# Patient Record
Sex: MALE | URBAN - METROPOLITAN AREA
[De-identification: names, ages, dates, MRNs, and addresses within clinical notes are randomized per-mention and may not be internally consistent; named-entity substitution may affect disease eponyms.]

---

## 2017-03-21 ENCOUNTER — PRE VISIT (OUTPATIENT)
Dept: OTOLARYNGOLOGY | Facility: CLINIC | Age: 47
End: 2017-03-21

## 2017-03-21 NOTE — TELEPHONE ENCOUNTER
1.  Date/reason for appt: 4/3/17 - throat pain  2.  Referring provider: Self  3.  Call to patient (Yes / No - short description): No, per  Amanda Reynolds who spoke with pt, pt has no records.   4.  Previous care at: None

## 2017-04-03 ENCOUNTER — OFFICE VISIT (OUTPATIENT)
Dept: OTOLARYNGOLOGY | Facility: CLINIC | Age: 47
End: 2017-04-03

## 2017-04-03 VITALS — BODY MASS INDEX: 28.58 KG/M2 | HEIGHT: 69 IN | WEIGHT: 193 LBS

## 2017-04-03 DIAGNOSIS — K21.9 GASTROESOPHAGEAL REFLUX DISEASE, ESOPHAGITIS PRESENCE NOT SPECIFIED: ICD-10-CM

## 2017-04-03 DIAGNOSIS — R07.0 THROAT PAIN: Primary | ICD-10-CM

## 2017-04-03 DIAGNOSIS — K21.9 ESOPHAGEAL REFLUX: ICD-10-CM

## 2017-04-03 RX ORDER — OMEPRAZOLE 40 MG/1
40 CAPSULE, DELAYED RELEASE ORAL DAILY
Qty: 30 CAPSULE | Refills: 3 | Status: SHIPPED | OUTPATIENT
Start: 2017-04-03 | End: 2017-05-03

## 2017-04-03 ASSESSMENT — PAIN SCALES - GENERAL: PAINLEVEL: NO PAIN (0)

## 2017-04-03 NOTE — PROGRESS NOTES
Dear Colleague:    I had the pleasure of meeting Haven Cifuentes in consultation at the [facility] of the [facility] on a self-referred basis, for evaluation of chronic throat-clearing and throat pain. The note from our visit follows.  I appreciate the opportunity to participate in the care of this pleasant patient.    Please feel free to contact me with any questions.    Sincerely yours,    Catrina Morrison M.D., M.P.H.  , Laryngology  Director, [facility]  Otolaryngology- Head & Neck Surgery  [phone]          =====    History of Present Illness:   Haven Cifuentes is a pleasant 46 year old male who presents with a couple month history of throat concerns.      Throat discomfort  He has had a dry sore throat for a couple of months. Voice use does not affect his sore throat; cold weather seems to make it worse.    He also feels like there is a bad smell in his mouth/throat/nose. He does not feel like he has nasal regurgitation but he notes that he gets a sense of tasting his food again, several hours after he eats. There is no jose regurgitation of food or liquid. The sore throat is worse when the taste is worse.    He saw his primary care provider and reports that antibiotics were tried; this did not help.    Voice  No changes or concerns.    Swallowing  No concerns.     Cough/Throat-clearing  No concerns.     Breathing  No concerns, although he reports that he snores.    Reflux-type symptoms  He does not feel that he experiences heartburn/indigestion. He is not taking reflux medications.      MEDICATIONS:     No current outpatient prescriptions on file.       ALLERGIES:  No Known Allergies    PAST MEDICAL HISTORY: History reviewed. No pertinent past medical history.     PAST SURGICAL HISTORY: History reviewed. No pertinent surgical history. Prior left neck mass removal, benign by patient report;  2013.    HABITS/SOCIAL HISTORY:    Social History   Substance Use Topics     Smoking status: Never Smoker     Smokeless tobacco: Not on file     Alcohol use Not on file         FAMILY HISTORY:  History reviewed. No pertinent family history.    REVIEW OF SYSTEMS:  The patient completed a comprehensive 11 point review of systems (below), which was reviewed. Positives are as noted below; pertinent findings are as noted in the HPI.     Patient Supplied Answers to Review of Systems  No flowsheet data found.    PHYSICAL EXAMINATION:  General: The patient was alert and conversant, and in no acute distress.    Eyes: PERRL, conjunctiva and lids normal, sclera nonicteric.  Nose: Anterior rhinoscopy: no gross abnormalities. no  bleeding; no  mucopurulence; septum grossly normal, mild mucoid drainage and/or crusting.  Oral cavity/oropharynx: No masses or lesions. Dentition in fair condition. Floor of mouth and oral tongue soft to palpation. Tongue mobility and palate elevation intact and symmetric.  Ears: Normal auricles, external auditory canals bilaterally. Visualized portions of tympanic membranes normal bilaterally.   Neck: No palpable cervical lymphadenopathy. There was no significant tenderness to palpation of the thyrohyoid space. No obvious thyroid abnormality. Landmarks palpable.  Resp: Breathing comfortably, no stridor or stertor.  Neuro: Symmetric facial function. Other cranial nerves as documented above.  Psych: Normal affect, pleasant and cooperative.  Voice/speech: Mild dysphonia characterized by breathiness, roughness, strain and slightly elevated Fo.  Extremities: No cyanosis, clubbing, or edema of the upper extremities.    Intake scores  Total Score for Last Patient-Answered VHI Questionnaire  VHI Total Score 4/3/2017   VHI Total Score 0     Total Score for Last Patient-Answered EAT Questionnaire  EAT Total Score 4/3/2017   EAT Total Score 0     Total Score for Last Patient-Answered CSI Questionnaire  CSI Total  Score 4/3/2017   CSI Total Score 0       PROCEDURE:   Flexible fiberoptic laryngoscopy  Indications: This procedure was warranted to evaluate the patient's laryngeal function. Risks, benefits, and alternatives were discussed.  Description: After written informed consent was obtained, a time-out was performed to confirm patient identity, procedure, and procedure site. Topical 3% lidocaine with 0.25% phenylephrine was applied to the nasal cavities. I performed the endoscopy and no complications were apparent.  Performed by: Catrina Morrison MD MPH  Findings: Normal nasopharynx. Normal base of tongue, valleculae, and epiglottis. The right true vocal fold demonstrated normal mobility. The left true vocal fold demonstrated normal mobility. The medial edges of the vocal folds appeared mildly bowed. No focal mucosal lesions were observed on the true vocal folds. Glissade produced appropriate elongation. There was moderate supraglottic recruitment with connected speech. Mucosa of the false vocal folds, aryepiglottic folds, piriform sinuses, and posterior glottis unremarkable. Airway was patent. NBI highlighted some mild right posterior vocal process mucosal irritation.      IMPRESSION AND PLAN:   Haven Cifuentes presents with throat discomfort and reflux-type symptoms. I suggested an empiric trial of acid reflux medication (omeprazole 40 mg PO qday). If this is helpful, I recommended that he see his primary doctor Dr. Gill to hopefully wean down to the lowest effective dose. If his throat pain persists despite reflux treatment, other avenues of investigation and potential treatment could include cross-sectional imaging and speech therapy. My sense is that his swallow mechanism is intact, but a swallow study could be considered to assess for any anatomic abnormalities. He will return as needed. I appreciate the opportunity to participate in the care of this pleasant patient.

## 2017-04-03 NOTE — PROGRESS NOTES
[facility]  James Robison Jr., M.D., F.A.C.S.  Catrina Morrison M.D., M.P.H.  Sowmya Hutchins, Ph.D., CCC/SLP  Nadya Arteaga M.M. (voice), M.A., CCC/SLP  Umair Segundo M.M. (voice), M.A., [facility]/SLP    [facility]  VOICE EVALUATION/LARYNGEAL EXAMINATION REPORT    Patient: Haven Cifuentes  Date of Service: 4/3/2017    HISTORY  PATIENT INFORMATION  Haven Cifuentes was seen for brief consultation in conjunction with a visit to Dr. Morrison today.  Please refer to Dr. Morrison s dictation for a more complete history and impressions.  Patient has an approximately 2 month history of throat discomfort and a sensation of a bad taste/smell of food after eating.  As the history moved forward it was evident that speech services would not be required for his presenting concerns.  Should his needs or status change I would be happy to initiate a full evaluation at that time.    DIAGNOSIS/REASON FOR REFERRAL  Throat Pain / Evaluate, perform laryngeal exam, treat as appropriate    No charge for today s session; charges will be billed at the completion of the evaluation  NO CHARGE FACILITY FEE (44639)    PRIMARY ICD-10 code: R07.0 (Throat Pain)  SECONDARY ICD-10 code:  K21.9 (Gastroesophageal reflux disease)      Onel Segundo M.M., M.A., CCC-SLP  Speech-Language Pathologist  Certificate of Vocology  [phone]

## 2017-04-03 NOTE — NURSING NOTE
Chief Complaint   Patient presents with     Consult     New Throat      Pt states no pain today, just a dry throat.    N Jon MISTRY

## 2017-04-03 NOTE — MR AVS SNAPSHOT
After Visit Summary   4/3/2017    Haven Cifuentes    MRN: [id number]           Patient Information     Date Of Birth          1970        Visit Information        Provider Department      4/3/2017 1:40 PM Catrina Morrison MD [facility] Nose and Throat        Today's Diagnoses     Throat pain    -  1    Gastroesophageal reflux disease, esophagitis presence not specified          Care Instructions    1.  You were seen in the ENT Clinic today by Dr. Morrison.  If you have any questions or concerns after your appointment, please call [phone].  Press option #1 for scheduling related needs.  Press option #3 for Nurse advice.  2.  Plan is to initiate reflux treatment omeprazole.    Lianne Jo RN  [facility]   Head & Neck Surgery             Follow-ups after your visit        Who to contact     Please call your clinic at [phone] to:    Ask questions about your health    Make or cancel appointments    Discuss your medicines    Learn about your test results    Speak to your doctor   If you have compliments or concerns about an experience at your clinic, or if you wish to file a complaint, please contact [facility] Patient Relations at [phone] or email us at [email or web address]         Additional Information About Your Visit        MyChart Information     [employer] is an electronic gateway that provides easy, online access to your medical records. With [employer], you can request a clinic appointment, read your test results, renew a prescription or communicate with your care team.     To sign up for [employer] visit the website at [email or web address]   You will be asked to enter the access code listed below, as well as some personal information. Please follow the directions to create your username and password.     Your access code is: [email or web address]  Expires: 2017  6:30 AM     Your access code will  in 90 days.  "If you need help or a new code, please contact your [facility] or call [phone] for assistance.        Care EveryWhere ID     This is your Care EveryWhere ID. This could be used by other organizations to access your [place] medical records  [id number]        Your Vitals Were     Height BMI (Body Mass Index)                1.76 m (5' 9.29\") 28.26 kg/m2           Blood Pressure from Last 3 Encounters:   No data found for BP    Weight from Last 3 Encounters:   04/03/17 87.5 kg (193 lb)              We Performed the Following     IMAGESTREAM RECORDING ORDER     LARYNGOSCOPY FLEX FIBEROPTIC, DIAGNOSTIC          Today's Medication Changes          These changes are accurate as of: 4/3/17  2:24 PM.  If you have any questions, ask your nurse or doctor.               Start taking these medicines.        Dose/Directions    omeprazole 40 MG capsule   Commonly known as:  priLOSEC   Used for:  Gastroesophageal reflux disease, esophagitis presence not specified, Throat pain   Started by:  Catrina Morrison MD        Dose:  40 mg   Take 1 capsule (40 mg) by mouth daily   Quantity:  30 capsule   Refills:  3            Where to get your medicines      These medications were sent to [place] [address] [place] [address] [address]     Phone:  [phone]     omeprazole 40 MG capsule                Primary Care Provider    None Specified       No primary provider on file.        Thank you!     Thank you for choosing [facility]  for your care. Our goal is always to provide you with excellent care. Hearing back from our patients is one way we can continue to improve our services. Please take a few minutes to complete the written survey that you may receive in the mail after your visit with us. Thank you!             Your Updated Medication List - Protect others around you: Learn how to safely use, store and throw away your " medicines at www.disposemymeds.org.          This list is accurate as of: 4/3/17  2:24 PM.  Always use your most recent med list.                   Brand Name Dispense Instructions for use    omeprazole 40 MG capsule    priLOSEC    30 capsule    Take 1 capsule (40 mg) by mouth daily

## 2017-04-03 NOTE — MR AVS SNAPSHOT
After Visit Summary   4/3/2017    Haven Cifuentes    MRN: [id number]           Patient Information     Date Of Birth          1970        Visit Information        Provider Department      4/3/2017 1:40 PM Umair Segundo SLP [facility]        Today's Diagnoses     Throat pain    -  1    Esophageal reflux           Follow-ups after your visit        Who to contact     Please call your clinic at [phone] to:    Ask questions about your health    Make or cancel appointments    Discuss your medicines    Learn about your test results    Speak to your doctor   If you have compliments or concerns about an experience at your clinic, or if you wish to file a complaint, please contact [facility] Patient Relations at [phone] or email us at [email or web address]         Additional Information About Your Visit        MyChart Information     [employer] is an electronic gateway that provides easy, online access to your medical records. With [employer], you can request a clinic appointment, read your test results, renew a prescription or communicate with your care team.     To sign up for [employer] visit the website at [email or web address]   You will be asked to enter the access code listed below, as well as some personal information. Please follow the directions to create your username and password.     Your access code is: [email or web address]  Expires: 2017  6:30 AM     Your access code will  in 90 days. If you need help or a new code, please contact your [facility] or call [phone] for assistance.        Care EveryWhere ID     This is your Care EveryWhere ID. This could be used by other organizations to access your [place] medical records  [id number]         Blood Pressure from Last 3 Encounters:   No data found for BP    Weight from Last 3 Encounters:   17 87.5 kg (193 lb)              Today, you had the  following     No orders found for display         Today's Medication Changes          These changes are accurate as of: 4/3/17  2:33 PM.  If you have any questions, ask your nurse or doctor.               Start taking these medicines.        Dose/Directions    omeprazole 40 MG capsule   Commonly known as:  priLOSEC   Used for:  Gastroesophageal reflux disease, esophagitis presence not specified, Throat pain   Started by:  Catrina Morrison MD        Dose:  40 mg   Take 1 capsule (40 mg) by mouth daily   Quantity:  30 capsule   Refills:  3            Where to get your medicines      These medications were sent to [place] [address] [place], [place] - [address]  [address] [facility] [address]     Phone:  [phone]     omeprazole 40 MG capsule                Primary Care Provider    None Specified       No primary provider on file.        Thank you!     Thank you for choosing [facility]  for your care. Our goal is always to provide you with excellent care. Hearing back from our patients is one way we can continue to improve our services. Please take a few minutes to complete the written survey that you may receive in the mail after your visit with us. Thank you!             Your Updated Medication List - Protect others around you: Learn how to safely use, store and throw away your medicines at www.disposemymeds.org.          This list is accurate as of: 4/3/17  2:33 PM.  Always use your most recent med list.                   Brand Name Dispense Instructions for use    omeprazole 40 MG capsule    priLOSEC    30 capsule    Take 1 capsule (40 mg) by mouth daily

## 2017-04-03 NOTE — NURSING NOTE
Procedure: Upper aerodigestive tract endoscopy, Flexible or rigid laryngoscopy, possible stroboscopy, possible flexible endoscopic evaluation of swallowing   Reason: symptoms requiring examination   PREPROCEDURE:   Yes Patient ID verified with 2 identifiers (name and  or MRN)   Yes: Procedure and site verified with patient/designee (when able)   Yes: Accurate consent documentation in medical record   No: Marking not required. Reason: [ Procedure does not require site marking. ][ Provider is in continuous attendance with the patient from consent through completion of procedure. ][ Marking unable or refused by patient (see scanned diagram).   TIME OUT:   Yes: Time-Out performed immediately prior to starting procedure, including verbal and active participation of all team members addressing:   * Correct patient identity   * Confirmed that the correct side and site are marked   * An accurate procedure consent form   * Agreement on the procedure to be done   * Correct patient position   * Relevant images and results are properly labeled and appropriately displayed   * The need to administer antibiotics or fluids for irrigation purposes during the procedure as applicable   * Safety precations based on patient history or medication use   DURING PROCEDURE: Verification of correct person, site, and procedure occurs any time the responsibility for care of the patient is transferred to another member of the care team.   Lianne Jo RN  P Otolaryngology/Head & Neck Surgery

## 2017-04-03 NOTE — PATIENT INSTRUCTIONS
1.  You were seen in the ENT Clinic today by Dr. Morrison.  If you have any questions or concerns after your appointment, please call [phone].  Press option #1 for scheduling related needs.  Press option #3 for Nurse advice.  2.  Plan is to initiate reflux treatment omeprazole.    Lianne Jo RN  [facility]   Head & Neck Surgery

## 2017-11-06 ENCOUNTER — OFFICE VISIT (OUTPATIENT)
Dept: OTOLARYNGOLOGY | Facility: CLINIC | Age: 47
End: 2017-11-06

## 2017-11-06 DIAGNOSIS — K21.9 GASTROESOPHAGEAL REFLUX DISEASE, ESOPHAGITIS PRESENCE NOT SPECIFIED: ICD-10-CM

## 2017-11-06 DIAGNOSIS — B49 FUNGAL INFECTION: ICD-10-CM

## 2017-11-06 DIAGNOSIS — J38.3 LEUKOPLAKIA OF VOCAL CORDS: ICD-10-CM

## 2017-11-06 DIAGNOSIS — R07.0 THROAT PAIN: Primary | ICD-10-CM

## 2017-11-06 RX ORDER — FLUCONAZOLE 100 MG/1
TABLET ORAL
Qty: 15 TABLET | Refills: 0 | Status: SHIPPED | OUTPATIENT
Start: 2017-11-06

## 2017-11-06 ASSESSMENT — PAIN SCALES - GENERAL: PAINLEVEL: SEVERE PAIN (6)

## 2017-11-06 NOTE — LETTER
"11/6/2017    RE: Haven Mueller  [address]   [place] [facility] [address]       Dear Colleague:    Haven Mueller recently returned for follow-up at the [facility]. My clinic note from our visit is enclosed below.     I appreciate the ongoing opportunity to participate in this patient's care.    Please feel free to contact me with any questions.      Sincerely yours,  Catrina Morrison M.D., M.P.H.  , Laryngology  Director, [facility]  Otolaryngology- Head & Neck Surgery  [phone]            =====  HISTORY OF PRESENT ILLNESS:  Haven Mueller is a pleasant 47-year-old male with a history of throat discomfort and reflux-type symptoms as well as a history of left hemithyroidectomy for follicular adenoma in 2013, who returns in follow up today. He was last seen in April 2017. At that time I recommended a trial of acid reflux therapy. We also discussed consideration of cross-sectional imaging and/or a swallow study if his symptoms persisted.    He returned to his primary care provider Dr. Gill and was found to have H. pylori, which was treated. He has not had further follow-up since that time. He is still on omeprazole. His reflux symptoms persist. He is also having some chest discomfort, and a sense of heat in his chest.     He reports that for the past week or so he has had a sore throat. He describes it as a \"usual cold.\" He is also concerned about tonsil stones. He has not seen any other provider about this concern.        MEDICATIONS:     Current Outpatient Prescriptions   Medication Sig Dispense Refill     fluconazole (DIFLUCAN) 100 MG tablet 2 tabs PO qday x 1 day, then 1 tab PO qday x 13 days for a total of 14 day course. 15 tablet 0       ALLERGIES:  No Known Allergies    NEW PMH/PSH: None    REVIEW OF SYSTEMS:  The patient completed a comprehensive 11 point review of systems (below), which was reviewed. Positives are as noted " below.  Patient Supplied Answers to Review of Systems  No flowsheet data found.      PHYSICAL EXAM:  General: The patient was alert and conversant, and in no acute distress.    Oral cavity/oropharynx: No masses or lesions. Dentition unchanged since prior. Tongue mobility and palate elevation intact and symmetric. Tonsils unremarkable. Mild pharyngeal erythema, no plaques/ulcerations. No tonsilliths seen nor extruded with pressure.  Neck: No palpable cervical lymphadenopathy, mild tenderness to palpation of the thyrohyoid space, which was narrow. No obvious thyroid abnormality.  Resp: Breathing comfortably, no stridor or stertor.  Neuro: Symmetric facial function. Other cranial nerve function as documented above.  Psych: Normal affect, pleasant and cooperative.  Voice/speech: No significant dysphonia.      Intake scores  Last 2 Scores for Patient-Answered VHI Questionnaire  VHI Total Score 4/3/2017   VHI Total Score 0      Last 2 Scores for Patient-Answered EAT Questionnaire  EAT Total Score 4/3/2017   EAT Total Score 0      Last 2 Scores for Patient-Answered CSI Questionnaire  CSI Total Score 4/3/2017   CSI Total Score 0       Procedure:   Flexible fiberoptic laryngoscopy and laryngovideostroboscopy  Indications: This procedure was warranted to evaluate the patient's laryngeal anatomy and function. Risks, benefits, and alternatives were discussed.  Description: After written informed consent was obtained, a time-out was performed to confirm patient identity, procedure, and procedure site. Topical 3% lidocaine with 0.25% phenylephrine was applied to the nasal cavities. I performed the endoscopy and no complications were apparent. Continuous and stroboscopic light were utilized to assess routine phonation and variable frequency phonation.  Performed by: Catrina Morrison MD MPH  SLP: NA  Findings: Normal nasopharynx. Normal base of tongue, valleculae, and epiglottis. Vocal fold mobility: right: normal; left: normal.  Medial edges of the vocal folds: smooth and straight, possible mild bowing on the left at times. No focal mucosal lesions were observed on the true vocal folds. Glissade produced appropriate elongation. There was moderate to severe supraglottic recruitment with connected speech. Mucosa of false vocal folds, aryepiglottic folds, piriform sinuses, and posterior glottis unremarkable with the exception of new small left posterior granuloma/leukoplakia. Airway was patent. Response to the therapy probes was good. The addition of Narrow Band Imaging (NBI) did not show any additional abnormalities.    The addition of stroboscopy allowed evaluation of the mucosal wave.   Amplitude: right: normal; left: mildly decreased. Symmetry: intermittent symmetry. Closure pattern: complete. Closure plane: at glottic level. Phase distribution: normal. Stroboscopy visualization was somewhat limited by supraglottic hyperfunction as well as gag.        IMPRESSION AND PLAN:   Haven Mueller returns for primary evaluation for an upper respiratory infection. I did not see concerning findings specific to the URI, but did note a new laryngeal granuloma/leukoplakic region. He continues to report daily reflux symptoms and chest pain/heartburn despite proton pump inhibitors. Treatment for H. pylori was completed in May.    Plan:  1) Gastroenterology referral for persistent and significant reflux symptoms given potential impact on larynx and overall health.  2) Speech therapy for associated irritable larynx symptoms, which may be contributing to posterior laryngeal inflammation.  3) Empiric treatment with fluconazole given multiple antibiotics over the summer.    He will return in two months, or sooner as needed. I appreciate the opportunity to participate in the care of this pleasant patient.      Catrina Morrison MD

## 2017-11-06 NOTE — MR AVS SNAPSHOT
After Visit Summary   11/6/2017    Haven Mueller    MRN: [id number]           Patient Information     Date Of Birth          1970        Visit Information        Provider Department      11/6/2017 1:20 PM Catrina Morrison MD [facility] Ear Nose and Throat        Today's Diagnoses     Throat pain    -  1    Esophageal reflux        Fungal infection          Care Instructions    1.  You were seen in the ENT Clinic today by Dr. Morrison.  If you have any questions or concerns after your appointment, please call [phone].  Press option #1 for scheduling related needs.  Press option #3 for Nurse advice.  2.  Plan is to return to clinic in 2-3 months.  3. Gastro referral to [facility].  This will be faxed and you will receive a call to schedule.  4. Please [facility] at [facility].       Lianne DIOP, RN  [facility] ENT   Head & Neck Surgery               Follow-ups after your visit        Additional Services     GASTROENTEROLOGY ADULT REF CONSULT ONLY       Preferred Location: [place] [facility] [phone]      Please be aware that coverage of these services is subject to the terms and limitations of your health insurance plan.  Call member services at your health plan with any benefit or coverage questions.  Any procedures must be performed at a [place] facility OR coordinated by your clinic's referral office.    Please bring the following with you to your appointment:    (1) Any X-Rays, CTs or MRIs which have been performed.  Contact the facility where they were done to arrange for  prior to your scheduled appointment.    (2) List of current medications   (3) This referral request   (4) Any documents/labs given to you for this referral            OTOLARYNGOLOGY REFERRAL       SPEECH-LANGUAGE PATHOLOGY SERVICE(S) REQUESTED:  Evaluate and treat    Sowmya Hutchins, Ph.D., CCC/SLP  Speech-Language Pathologist  Director, [facility]  [phone]    Nadya Arteaga M.M., M.A., CCC/SLP  Speech-Language Pathologist  [facility]  [phone]                  Your next 10 appointments already scheduled     Nov 30, 2017 10:00 AM CST   (Arrive by 9:45 AM)   RETURN SLP VOICE with LISA Ye    [facility]    [address] [place] [address]   [phone]            Dec 07, 2017 11:00 AM CST   (Arrive by 10:45 AM)   RETURN SLP VOICE with LISA Ye    [facility] ([facility])    [address] [place] [address]   [phone]            Dec 21, 2017 11:00 AM CST   (Arrive by 10:45 AM)   RETURN SLP VOICE with LISA Ye   [facility] ([facility])    [address] [place] [address]   [phone]            Jan 08, 2018  1:20 PM CST   (Arrive by 1:05 PM)   Return Visit with Catrina Morrison MD   [facility] Ear Nose and Throat ([facility])    [address] [place] [address]   [phone]           This is a multi-disciplinary care team visit as patients with your type of problem are usually seen by a team of an MD and a Speech-Language Pathologist (who is a specialist in disorders of the voice, throat, and breathing).  Please plan about 2 hours for your visit, which will likely include Laryngeal Function Studies, a Voice/Swallow/Breathing Evaluation, and an Endoscopic Laryngeal Examination to provide a comprehensive evaluation.  Please check with your insurance company to ensure you are covered for these services. - It is important to know that if you are evaluated and/or treated by both a physician and a speech pathologist during your visit, your billing will reflect the input that you receive from both providers as separate professionals. Although most insurance plans do cover  these services, we encourage you to contact your insurance company prior to your visit to determine whether there are any coverage limitations that might affect you financially. - Billing/procedure codes that are frequently associated with visits to our clinic include (but are not limited to) the ones listed below. Most patients will not need all of these items, but it may be useful to ask your insurance company's patient . 48047: Flexible fiberoptic laryngoscopy, 92170: Laryngoscopy; flexible or rigid fiberoptic, with stroboscopy, 89313: Flexible endoscopic evaluation of swallowing, 10911: Laryngeal function aerodynamic evaluation, 72042: Evaluation of Voice and Resonance, 90405: Speech pathology treatment for voice, speech, communication, 54634: Speech pathology treatment for swallowing/oral function for feeding - If you have any concerns or questions, or if you would prefer not to have a speech pathologist involved in your visit, please contact our Clinic Coordinator at [phone], at least 24 hours prior to your appointment.              Who to contact     Please call your clinic at [phone] to:    Ask questions about your health    Make or cancel appointments    Discuss your medicines    Learn about your test results    Speak to your doctor   If you have compliments or concerns about an experience at your clinic, or if you wish to file a complaint, please contact [facility] Patient Relations at [phone] or email us at [email or web address]         Additional Information About Your Visit        MyChart Information     [employer] is an electronic gateway that provides easy, online access to your medical records. With MeetMe, you can request a clinic appointment, read your test results, renew a prescription or communicate with your care team.     To sign up for [employer] visit the website at [email or web address]   You will be asked to  enter the access code listed below, as well as some personal information. Please follow the directions to create your username and password.     Your access code is: [email or web address]  Expires: 2018  5:30 AM     Your access code will  in 90 days. If you need help or a new code, please contact your [facility] or call [phone] for assistance.        Care EveryWhere ID     This is your Care EveryWhere ID. This could be used by other organizations to access your [place] medical records  [id number]         Blood Pressure from Last 3 Encounters:   No data found for BP    Weight from Last 3 Encounters:   17 87.5 kg (193 lb)              We Performed the Following     GASTROENTEROLOGY ADULT REF CONSULT ONLY     IMAGESTREAM RECORDING ORDER     OTOLARYNGOLOGY REFERRAL          Today's Medication Changes          These changes are accurate as of: 17  3:03 PM.  If you have any questions, ask your nurse or doctor.               Start taking these medicines.        Dose/Directions    fluconazole 100 MG tablet   Commonly known as:  DIFLUCAN   Used for:  Fungal infection   Started by:  Catrina Morrison MD        2 tabs PO qday x 1 day, then 1 tab PO qday x 13 days for a total of 14 day course.   Quantity:  15 tablet   Refills:  0            Where to get your medicines      These medications were sent to [address] [address]     Phone:  [phone]     fluconazole 100 MG tablet                Primary Care Provider    None Specified       No primary provider on file.        Equal Access to Services     [facility] : Hadnia jimenez [place], waaxda luqadaha, qaybta kaalmada bimal yeung . So [facility] [phone].    ATENCIÓN: Si habla español, tiene a hebert disposición servicios gratuitos de asistencia lingüística. Llame al [phone].    We comply with  applicable federal civil rights laws and [place] laws. We do not discriminate on the basis of race, color, national origin, age, disability, sex, sexual orientation, or gender identity.            Thank you!     Thank you for choosing [facility] EAR NOSE AND THROAT  for your care. Our goal is always to provide you with excellent care. Hearing back from our patients is one way we can continue to improve our services. Please take a few minutes to complete the written survey that you may receive in the mail after your visit with us. Thank you!             Your Updated Medication List - Protect others around you: Learn how to safely use, store and throw away your medicines at www.disposemymeds.org.          This list is accurate as of: 11/6/17  3:03 PM.  Always use your most recent med list.                   Brand Name Dispense Instructions for use Diagnosis    fluconazole 100 MG tablet    DIFLUCAN    15 tablet    2 tabs PO qday x 1 day, then 1 tab PO qday x 13 days for a total of 14 day course.    Fungal infection

## 2017-11-06 NOTE — PROGRESS NOTES
"Dear Colleague:    Haven Mueller recently returned for follow-up at the [facility]. My clinic note from our visit is enclosed below.     I appreciate the ongoing opportunity to participate in this patient's care.    Please feel free to contact me with any questions.      Sincerely yours,  Catrina Morrison M.D., M.P.H.  , Laryngology  Director, [facility]  Otolaryngology- Head & Neck Surgery  [phone]            =====  HISTORY OF PRESENT ILLNESS:  Haven Mueller is a pleasant 47-year-old male with a history of throat discomfort and reflux-type symptoms as well as a history of left hemithyroidectomy for follicular adenoma in 2013, who returns in follow up today. He was last seen in April 2017. At that time I recommended a trial of acid reflux therapy. We also discussed consideration of cross-sectional imaging and/or a swallow study if his symptoms persisted.    He returned to his primary care provider Dr. Gill and was found to have H. pylori, which was treated. He has not had further follow-up since that time. He is still on omeprazole. His reflux symptoms persist. He is also having some chest discomfort, and a sense of heat in his chest.     He reports that for the past week or so he has had a sore throat. He describes it as a \"usual cold.\" He is also concerned about tonsil stones. He has not seen any other provider about this concern.        MEDICATIONS:     Current Outpatient Prescriptions   Medication Sig Dispense Refill     fluconazole (DIFLUCAN) 100 MG tablet 2 tabs PO qday x 1 day, then 1 tab PO qday x 13 days for a total of 14 day course. 15 tablet 0       ALLERGIES:  No Known Allergies    NEW PMH/PSH: None    REVIEW OF SYSTEMS:  The patient completed a comprehensive 11 point review of systems (below), which was reviewed. Positives are as noted below.  Patient Supplied Answers to Review of Systems  No flowsheet data found.      PHYSICAL " EXAM:  General: The patient was alert and conversant, and in no acute distress.    Oral cavity/oropharynx: No masses or lesions. Dentition unchanged since prior. Tongue mobility and palate elevation intact and symmetric. Tonsils unremarkable. Mild pharyngeal erythema, no plaques/ulcerations. No tonsilliths seen nor extruded with pressure.  Neck: No palpable cervical lymphadenopathy, mild tenderness to palpation of the thyrohyoid space, which was narrow. No obvious thyroid abnormality.  Resp: Breathing comfortably, no stridor or stertor.  Neuro: Symmetric facial function. Other cranial nerve function as documented above.  Psych: Normal affect, pleasant and cooperative.  Voice/speech: No significant dysphonia.      Intake scores  Last 2 Scores for Patient-Answered VHI Questionnaire  VHI Total Score 4/3/2017   VHI Total Score 0      Last 2 Scores for Patient-Answered EAT Questionnaire  EAT Total Score 4/3/2017   EAT Total Score 0      Last 2 Scores for Patient-Answered CSI Questionnaire  CSI Total Score 4/3/2017   CSI Total Score 0       Procedure:   Flexible fiberoptic laryngoscopy and laryngovideostroboscopy  Indications: This procedure was warranted to evaluate the patient's laryngeal anatomy and function. Risks, benefits, and alternatives were discussed.  Description: After written informed consent was obtained, a time-out was performed to confirm patient identity, procedure, and procedure site. Topical 3% lidocaine with 0.25% phenylephrine was applied to the nasal cavities. I performed the endoscopy and no complications were apparent. Continuous and stroboscopic light were utilized to assess routine phonation and variable frequency phonation.  Performed by: Catrina Morrison MD MPH  SLP: NA  Findings: Normal nasopharynx. Normal base of tongue, valleculae, and epiglottis. Vocal fold mobility: right: normal; left: normal. Medial edges of the vocal folds: smooth and straight, possible mild bowing on the left at  times. No focal mucosal lesions were observed on the true vocal folds. Glissade produced appropriate elongation. There was moderate to severe supraglottic recruitment with connected speech. Mucosa of false vocal folds, aryepiglottic folds, piriform sinuses, and posterior glottis unremarkable with the exception of new small left posterior granuloma/leukoplakia. Airway was patent. Response to the therapy probes was good. The addition of Narrow Band Imaging (NBI) did not show any additional abnormalities.    The addition of stroboscopy allowed evaluation of the mucosal wave.   Amplitude: right: normal; left: mildly decreased. Symmetry: intermittent symmetry. Closure pattern: complete. Closure plane: at glottic level. Phase distribution: normal. Stroboscopy visualization was somewhat limited by supraglottic hyperfunction as well as gag.        IMPRESSION AND PLAN:   Haven Mueller returns for primary evaluation for an upper respiratory infection. I did not see concerning findings specific to the URI, but did note a new laryngeal granuloma/leukoplakic region. He continues to report daily reflux symptoms and chest pain/heartburn despite proton pump inhibitors. Treatment for H. pylori was completed in May.    Plan:  1) Gastroenterology referral for persistent and significant reflux symptoms given potential impact on larynx and overall health.  2) Speech therapy for associated irritable larynx symptoms, which may be contributing to posterior laryngeal inflammation.  3) Empiric treatment with fluconazole given multiple antibiotics over the summer.    He will return in two months, or sooner as needed. I appreciate the opportunity to participate in the care of this pleasant patient.

## 2017-11-06 NOTE — PATIENT INSTRUCTIONS
1.  You were seen in the ENT Clinic today by Dr. Morrison.  If you have any questions or concerns after your appointment, please call [phone].  Press option #1 for scheduling related needs.  Press option #3 for Nurse advice.  2.  Plan is to return to clinic in 2-3 months.  3. Gastro referral to [place].  This will be faxed and you will receive a call to schedule.  4. Please initiate speech [facility] at the [facility].       Lianne PAGANN, RN  [facility] ENT   Head & Neck Surgery

## 2017-11-30 ENCOUNTER — OFFICE VISIT (OUTPATIENT)
Dept: OTOLARYNGOLOGY | Facility: CLINIC | Age: 47
End: 2017-11-30

## 2017-11-30 DIAGNOSIS — K21.9 ESOPHAGEAL REFLUX: ICD-10-CM

## 2017-11-30 DIAGNOSIS — J38.3 VOCAL FOLD LEUKOPLAKIA: ICD-10-CM

## 2017-11-30 DIAGNOSIS — J38.7 IRRITABLE LARYNX SYNDROME: ICD-10-CM

## 2017-11-30 DIAGNOSIS — R07.0 THROAT PAIN: ICD-10-CM

## 2017-11-30 DIAGNOSIS — R49.0 DYSPHONIA: Primary | ICD-10-CM

## 2017-11-30 NOTE — LETTER
Date:December 1, 2017      Patient was self referred, no letter generated. Do not send.        [facility] Health Information

## 2017-11-30 NOTE — MR AVS SNAPSHOT
After Visit Summary   11/30/2017    Haven Mueller    MRN: [id number]           Patient Information     Date Of Birth          1970        Visit Information        Provider Department      11/30/2017 10:00 AM Umair Segundo SLP  [facility]        Today's Diagnoses     Dysphonia    -  1    Throat pain        Vocal fold leukoplakia        Irritable larynx syndrome        Esophageal reflux           Follow-ups after your visit        Your next 10 appointments already scheduled     Dec 07, 2017 11:00 AM CST   (Arrive by 10:45 AM)   RETURN SLP VOICE with LISA Ye    [facility] ([facility])    [address] [place] [address]   [phone]            Dec 21, 2017 11:00 AM CST   (Arrive by 10:45 AM)   RETURN SLP VOICE with LISA Ye    [facility] ([facility])    [address] [place] [address]   [phone]            Jan 08, 2018  1:20 PM CST   (Arrive by 1:05 PM)   Return Visit with Catrina Morrison MD   [facility] Ear Nose and Throat ([facility])    [address] [place] [address]   [phone]           This is a multi-disciplinary care team visit as patients with your type of problem are usually seen by a team of an MD and a Speech-Language Pathologist (who is a specialist in disorders of the voice, throat, and breathing).  Please plan about 2 hours for your visit, which will likely include Laryngeal Function Studies, a Voice/Swallow/Breathing Evaluation, and an Endoscopic Laryngeal Examination to provide a comprehensive evaluation.  Please check with your insurance company to ensure you are covered for these services. - It is important to know that if you are evaluated and/or treated by both a physician and a speech pathologist during your visit, your billing will reflect the input that you receive from  both providers as separate professionals. Although most insurance plans do cover these services, we encourage you to contact your insurance company prior to your visit to determine whether there are any coverage limitations that might affect you financially. - Billing/procedure codes that are frequently associated with visits to our clinic include (but are not limited to) the ones listed below. Most patients will not need all of these items, but it may be useful to ask your insurance company's patient . 37915: Flexible fiberoptic laryngoscopy, 58652: Laryngoscopy; flexible or rigid fiberoptic, with stroboscopy, 07798: Flexible endoscopic evaluation of swallowing, 05357: Laryngeal function aerodynamic evaluation, 71021: Evaluation of Voice and Resonance, 46437: Speech pathology treatment for voice, speech, communication, 83883: Speech pathology treatment for swallowing/oral function for feeding - If you have any concerns or questions, or if you would prefer not to have a speech pathologist involved in your visit, please contact our Clinic Coordinator at [phone], at least 24 hours prior to your appointment.              Who to contact     Please call your clinic at [phone] to:    Ask questions about your health    Make or cancel appointments    Discuss your medicines    Learn about your test results    Speak to your doctor   If you have compliments or concerns about an experience at your clinic, or if you wish to file a complaint, please contact [facility] Patient Relations at [phone] or email us at [email or web address]         Additional Information About Your Visit        BuzzDashhart Information     [employer] is an electronic gateway that provides easy, online access to your medical records. With [employer], you can request a clinic appointment, read your test results, renew a prescription or communicate with your care team.     To sign up for  Rumat visit the website at [email or web address]   You will be asked to enter the access code listed below, as well as some personal information. Please follow the directions to create your username and password.     Your access code is: [email or web address]  Expires: 2018  5:30 AM     Your access code will  in 90 days. If you need help or a new code, please contact your [facility] or call [phone] for assistance.        Care EveryWhere ID     This is your Care EveryWhere ID. This could be used by other organizations to access your [place] medical records  [id number]         Blood Pressure from Last 3 Encounters:   No data found for BP    Weight from Last 3 Encounters:   17 87.5 kg (193 lb)              We Performed the Following     C BEHAVIORAL & QUALITATIVE ANALYSIS VOICE AND RESONANCE     SPEECH/HEARING THERAPY, INDIVIDUAL        Primary Care Provider    None Specified       No primary provider on file.        Equal Access to Services     [facility]: Hadii joanna Lin, waaxda indra, qaybta kaalmada anjana, bimal brasher . So [facility] [phone].    ATENCIÓN: Si habla español, tiene a hebert disposición servicios gratuitos de asistencia lingüística. Llame al [phone].    We comply with applicable federal civil rights laws and [place] laws. We do not discriminate on the basis of race, color, national origin, age, disability, sex, sexual orientation, or gender identity.            Thank you!     Thank you for choosing  [facility]  for your care. Our goal is always to provide you with excellent care. Hearing back from our patients is one way we can continue to improve our services. Please take a few minutes to complete the written survey that you may receive in the mail after your visit with us. Thank you!             Your Updated Medication List - Protect others around you: Learn how to safely use, store and  throw away your medicines at www.disposemymeds.org.          This list is accurate as of: 11/30/17 12:49 PM.  Always use your most recent med list.                   Brand Name Dispense Instructions for use Diagnosis    fluconazole 100 MG tablet    DIFLUCAN    15 tablet    2 tabs PO qday x 1 day, then 1 tab PO qday x 13 days for a total of 14 day course.    Fungal infection

## 2017-11-30 NOTE — PROGRESS NOTES
"[facility]  James Robison Jr., M.D., F.A.C.S.  Catrina Morrison M.D., M.P.H.  Sowmya Hutchins, Ph.D., CCC/SLP  Nadya Arteaga M.M. (voice), M.A., CCC/SLP  Umair Segundo M.M. (voice), M.A., CCC/SLP    Patient: Haven Mueller  Date of Visit: 11/30/2017    CHIEF COMPLAINT: throat soreness, frequent throat clearing    HISTORY  Haven Mueller is a 47 year old year old gentleman, who was seen for evaluation following a visit with  Dr. Morrison. He was seen on 4/3/17 for initial evaluation, and most recently on 11/6/17.  Please refer to that report for full details; however, a brief summary of findings from Dr. Morrison's evaluation include:  \"Haven Mueller returns for primary evaluation for an upper respiratory infection. I did not see concerning findings specific to the URI, but did note a new laryngeal granuloma/leukoplakic region. He continues to report daily reflux symptoms and chest pain/heartburn despite proton pump inhibitors. Treatment for H. pylori was completed in May.\"  GI referral for reflux symptoms, empiric course of fluconazole, and speech therapy to address irritable larynx symptoms were recommended.  He presents today to initiate therapy, and as such, formal evaluation is warranted to determine candidacy.    INTERIM HISTORY:    Still experiencing sore throat occasional pain with swallowing    This worsens when he is experiences a cold or cough    Since the course of antifungals symptoms have improved, but not completely resolved    OBJECTIVE  PERCEPTUAL EVALUATION (CPT 34095)  POSTURE / TENSION:     jaw    BREATHING:     shallow    phonation is not coordinated with respiration    VOICE:    Roughness: Mild to moderate Consistent    Breathiness: Mild Consistent    Strain: Mild Consistent    Loudness    Conversational speech:  WNL    Pitch:    Conversational speech:  WNL    Pitch glide: Difficult accessing low modal pitches    Resonance:    Conversational speech:  laryngeal " pharyngeal resonance    CAPE-V Overall Severity:  31/100    COUGH/THROAT CLEARING:    Frequent    Dry    Increased in conjunction with voice use    Locus of cough/ throat clear: sounds consistent with upper airway     THERAPY PROBES: Improvement was elicited with use of forward resonant stimuli and coordination of respiration and phonation    ASSESSMENT / PLAN  IMPRESSIONS:  Mr. 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  Perceptual evaluation today is consistent with the laryngeal evaluation described by Dr. Morrison.    STIMULABILITY: results of therapy probes during perceptual and laryngeal evaluation demonstrate improvement with use of forward resonant stimuli and coordination of respiration and phonation    RECOMMENDATIONS:     Medically necessary speech therapy is warranted help reduce laryngeally impactful behaviros including chronic throat clearing, and non-optimal voice use which are conrtibutors to ongoing throat discomfort, and mucosal change noted during laryngeal exmaination    He demonstrates a Good prognosis for improvement given adherence to therapeutic recommendations. Therapeutic     DURATION / FREQUENCY: 4 bi-weekly one-hour sessions with 2 monthly one-hour follow-up sessions    GOALS:  Patient goal:   1. To understand the problem and fix it as much as possible    Short-term goal(s): Within the first 4 sessions, Mr. Mueller:  1. will demonstrate assigned laryngeal massage techniques with 80% accuracy or better with no clinician support  2. will be able to demonstrate provided cough suppression and substitution strategies from memory independently with 90% accuracy  3. will be able to independently list key factors in maintenance of good vocal hygiene with 80% accuracy, and report on their use outside the therapy room.  4. will utilize silent inhalation with good  low-respiratory engagement 75% of the time during therapy tasks with minimal clinician support  5. will demonstrate semi-occluded vocal tract (SOVT) exercises with at least 80% accuracy with no clinician support  6. will demonstrate the ability to alternate between target and habitual voice quality given clinician cue 75% of the time during therapy tasks    Long-term goal(s): In 6 months, Mr. Mueller will:  1. Report a week of typical vocal activities, in which dysphonia and throat discomfort do not exceed a level of 2 out of 10, 80% of the time     This treatment plan was developed with the patient who agreed with the recommendations.    _______________________________________________________________________  THERAPY NOTE (CPT 22217)  Date of Service: 11/30/2017    SUBJECTIVE / OBJECTIVE:  Please refer to my evaluation report from today's encounter for full details regarding subjective data, patient reported measures, and diagnostic findings.    THERAPEUTIC ACTIVITIES  Counseling and Education    Asked many questions about the nature of his symptoms, and I answered all of these thoroughly.    Instructed concepts and techniques for optimal vocal hygiene including:    Systemic hydration, including strategies for increasing daily water intake    Topical hydration - Gargling, saline nasal irrigation, humidification, steam, guaifenesin    Environmental barriers to healthy voicing - noise, inhaled irritants, room acoustics    Awareness and reduction of phonotraumatic behaviors    Moderating voice use    Substituting non-voice alternative behaviors    Avoiding cough and throat clearing    Management of laryngopharyngeal reflux disease (LPRD)    Dietary alterations which may reduce liklihood of reflux events    Avoiding eating or drinking within 2-3 hours of bedtime    Raising the headboard of the bed by 3-4 inches    Avoiding eating and drinking immediately prior to rigorous activity    Proper timing and use of acid  reflux medication discussed in general context with recommendation of follow-up with pharmacist for detailed instructions    Chronic cough / throat clearing reduction therapy    Suppression and substitution strategies were instructed including    Swallowing substitution techniques    Breathing suppression techniques to reduce laryngeal tension    Low impact glottic coup and soft cough    Techniques to raise awareness of habitual throat clearing    Additionally he was instructed to keep a log of what circumstances are eliciting cough / throat clear      Concepts of an optimal regimen for practice were instructed.  o He should use an interval schedule of practice, with brief periods of practice frequently throughout each day  o Rosine concepts of volitional practice to facilitate motor learning.    I provided handouts of today's therapeutic activities to facilitate practice.    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  Mr. Mueller had many questions regarding the nature of his symptoms, and every effort was made to help him recognize the correlation between his laryngeally impactful behaviors and the mucosal change at the posterior larynx and throat irritation symptoms.  He reported understanding of therapeutic rationale, and was able to demonstrate techniques with acceptable accuracy.     PLAN: I will see Mr. Mueller in one weeks, at which time we will reduce vocal fold impact during running speech through coordination of respiration and phonation.     TOTAL SERVICE TIME: 60 minutes  EVALUATION OF VOICE AND RESONANCE: (75587): 20 minutes    TREATMENT (53771): 40 minutes  NO CHARGE FACILITY FEE (28406)    Onel Segundo M.M., M.A., CCC-SLP  Speech-Language  Pathologist  Certificate of Vocology  [phone]

## 2017-11-30 NOTE — LETTER
"11/30/2017      RE: Haven Mueller  [address]   [place] [address]       [facility] VOICE [facility]IN[facility]  James Robison Jr., M.D., F.A.C.S.  Catrina Morrison M.D., M.P.H.  Sowmya Hutchins, Ph.D., CCC/SLP  Nadya Arteaga M.M. (voice), M.A., CCC/SLP  Umair Segundo M.M. (voice), M.A., CCC/SLP    Patient: Haven Mueller  Date of Visit: 11/30/2017    CHIEF COMPLAINT: throat soreness, frequent throat clearing    HISTORY  Haven Mueller is a 47 year old year old gentleman, who was seen for evaluation following a visit with  Dr. Morrison. He was seen on 4/3/17 for initial evaluation, and most recently on 11/6/17.  Please refer to that report for full details; however, a brief summary of findings from Dr. Morrison's evaluation include:  \"Haven Mueller returns for primary evaluation for an upper respiratory infection. I did not see concerning findings specific to the URI, but did note a new laryngeal granuloma/leukoplakic region. He continues to report daily reflux symptoms and chest pain/heartburn despite proton pump inhibitors. Treatment for H. pylori was completed in May.\"  GI referral for reflux symptoms, empiric course of fluconazole, and speech therapy to address irritable larynx symptoms were recommended.  He presents today to initiate therapy, and as such, formal evaluation is warranted to determine candidacy.    INTERIM HISTORY:    Still experiencing sore throat occasional pain with swallowing    This worsens when he is experiences a cold or cough    Since the course of antifungals symptoms have improved, but not completely resolved    OBJECTIVE  PERCEPTUAL EVALUATION (CPT 97757)  POSTURE / TENSION:     jaw    BREATHING:     shallow    phonation is not coordinated with respiration    VOICE:    Roughness: Mild to moderate Consistent    Breathiness: Mild Consistent    Strain: Mild Consistent    Loudness    Conversational speech:  WNL    Pitch:    Conversational speech:  WNL    Pitch glide: " Difficult accessing low modal pitches    Resonance:    Conversational speech:  laryngeal pharyngeal resonance    CAPE-V Overall Severity:  31/100    COUGH/THROAT CLEARING:    Frequent    Dry    Increased in conjunction with voice use    Locus of cough/ throat clear: sounds consistent with upper airway     THERAPY PROBES: Improvement was elicited with use of forward resonant stimuli and coordination of respiration and phonation    ASSESSMENT / PLAN  IMPRESSIONS:  Mr. 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  Perceptual evaluation today is consistent with the laryngeal evaluation described by Dr. Morrison.    STIMULABILITY: results of therapy probes during perceptual and laryngeal evaluation demonstrate improvement with use of forward resonant stimuli and coordination of respiration and phonation    RECOMMENDATIONS:     Medically necessary speech therapy is warranted help reduce laryngeally impactful behaviros including chronic throat clearing, and non-optimal voice use which are conrtibutors to ongoing throat discomfort, and mucosal change noted during laryngeal exmaination    He demonstrates a Good prognosis for improvement given adherence to therapeutic recommendations. Therapeutic     DURATION / FREQUENCY: 4 bi-weekly one-hour sessions with 2 monthly one-hour follow-up sessions    GOALS:  Patient goal:   1. To understand the problem and fix it as much as possible    Short-term goal(s): Within the first 4 sessions, Mr. Mueller:  1. will demonstrate assigned laryngeal massage techniques with 80% accuracy or better with no clinician support  2. will be able to demonstrate provided cough suppression and substitution strategies from memory independently with 90% accuracy  3. will be able to independently list key factors in maintenance of good vocal hygiene with 80% accuracy, and report on  their use outside the therapy room.  4. will utilize silent inhalation with good low-respiratory engagement 75% of the time during therapy tasks with minimal clinician support  5. will demonstrate semi-occluded vocal tract (SOVT) exercises with at least 80% accuracy with no clinician support  6. will demonstrate the ability to alternate between target and habitual voice quality given clinician cue 75% of the time during therapy tasks    Long-term goal(s): In 6 months, Mr. Mueller will:  1. Report a week of typical vocal activities, in which dysphonia and throat discomfort do not exceed a level of 2 out of 10, 80% of the time     This treatment plan was developed with the patient who agreed with the recommendations.    _______________________________________________________________________  THERAPY NOTE (CPT 56472)  Date of Service: 11/30/2017    SUBJECTIVE / OBJECTIVE:  Please refer to my evaluation report from today's encounter for full details regarding subjective data, patient reported measures, and diagnostic findings.    THERAPEUTIC ACTIVITIES  Counseling and Education    Asked many questions about the nature of his symptoms, and I answered all of these thoroughly.    Instructed concepts and techniques for optimal vocal hygiene including:    Systemic hydration, including strategies for increasing daily water intake    Topical hydration - Gargling, saline nasal irrigation, humidification, steam, guaifenesin    Environmental barriers to healthy voicing - noise, inhaled irritants, room acoustics    Awareness and reduction of phonotraumatic behaviors    Moderating voice use    Substituting non-voice alternative behaviors    Avoiding cough and throat clearing    Management of laryngopharyngeal reflux disease (LPRD)    Dietary alterations which may reduce liklihood of reflux events    Avoiding eating or drinking within 2-3 hours of bedtime    Raising the headboard of the bed by 3-4 inches    Avoiding eating and  drinking immediately prior to rigorous activity    Proper timing and use of acid reflux medication discussed in general context with recommendation of follow-up with pharmacist for detailed instructions    Chronic cough / throat clearing reduction therapy    Suppression and substitution strategies were instructed including    Swallowing substitution techniques    Breathing suppression techniques to reduce laryngeal tension    Low impact glottic coup and soft cough    Techniques to raise awareness of habitual throat clearing    Additionally he was instructed to keep a log of what circumstances are eliciting cough / throat clear      Concepts of an optimal regimen for practice were instructed.  o He should use an interval schedule of practice, with brief periods of practice frequently throughout each day  o Welby concepts of volitional practice to facilitate motor learning.    I provided handouts of today's therapeutic activities to facilitate practice.    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  Mr. Mueller had many questions regarding the nature of his symptoms, and every effort was made to help him recognize the correlation between his laryngeally impactful behaviors and the mucosal change at the posterior larynx and throat irritation symptoms.  He reported understanding of therapeutic rationale, and was able to demonstrate techniques with acceptable accuracy.     PLAN: I will see Mr. Mueller in one weeks, at which time we will reduce vocal fold impact during running speech through coordination of respiration and phonation.     TOTAL SERVICE TIME: 60 minutes  EVALUATION OF VOICE AND RESONANCE: (12795): 20 minutes    TREATMENT (69335): 40 minutes  NO CHARGE FACILITY FEE  (78190)    Onel Segundo M.M., M.A., CCC-SLP  Speech-Language Pathologist  Certificate of Vocology  [phone]        Umair Segundo, SLP

## 2017-12-07 ENCOUNTER — OFFICE VISIT (OUTPATIENT)
Dept: OTOLARYNGOLOGY | Facility: CLINIC | Age: 47
End: 2017-12-07

## 2017-12-07 DIAGNOSIS — R49.0 DYSPHONIA: ICD-10-CM

## 2017-12-07 DIAGNOSIS — J38.3 VOCAL FOLD LEUKOPLAKIA: ICD-10-CM

## 2017-12-07 DIAGNOSIS — R07.0 THROAT PAIN: ICD-10-CM

## 2017-12-07 DIAGNOSIS — J38.7 IRRITABLE LARYNX SYNDROME: ICD-10-CM

## 2017-12-07 NOTE — LETTER
"12/7/2017      RE: Haven Mueller  [address]   [place] [address]       [facility] VOICE CLINIC  THERAPY NOTE (CPT 07128)    Patient: Haven Mueller  Date of Service: 12/7/2017  Referring physician: Dr. Morrison  Impressions from most recent evaluation:  \"Mr. 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 \"    SUBJECTIVE:  Since his last session, Mr. Mueller reports the following:     Overall he reports that symptoms are better    Successes: reduced throat clearing, drinking more water, improved hygiene    OBJECTIVE:  PATIENT REPORTED MEASURES:  Not Applicable    0 Disagree strongly  1 Disagree    2 Neither agree nor disagree  3 Agree    4 Agree strongly    5   Since my last session, I used the speech therapy exercises and strategies as recommended by my speech pathologist. 5   I feel that using my therapy techniques has become a habit. 4   I feel confident in my ability to manage my current and future symptoms. 5      Greatly worsened  -2 Worsened  -1 Stayed the same  0 Improved   1 Greatly improved  2      Since my last session I feel my symptoms have ________. 4      Overall, since starting therapy I feel my symptoms are ________. 7   Worse  (see below) About the same  0 Better  (see below)   -1 Almost the same, hardly worse at all  -2 A little worse  -3 Somewhat worse  -4 Moderately worse  -5 A good deal worse  -6 A great deal worse  -7 A very great deal worse 1 Almost the same, hardly better at all  2 A little better  3 Somewhat better  4 Moderately better  5 A good deal better  6 A great deal better  7 A very great deal better     THERAPEUTIC ACTIVITIES    Exercises to promote optimal respiratory mechanics    I provided explanation of the anatomy and physiology of respiration for speech and singing; he found this to be helpful    Practiced in a forward leaning seated " "posture to facilitate awareness of low respiratory engagement    With clinician support, patient was able to demonstrate improved abdominal relaxation and engagement on inhalation    Exercises to promote reduced perilaryngeal muscle tension    Four way neck stretch instructed    Modifications for additional extension instructed    Base of tongue stretch instructed    Proper form for each stretch was emphasized, including:    Maintenance of posture for 10 breaths (~20-30 seconds)    Awareness of tension on inhalation with volitional relaxation into the stretch on exhalation    Avoidance of \"forcing\" a posture, only progressing far enough to feel the stretch    He reported awareness of significant tension during lateral neck stretches    He noted increased sense of muscular relaxation following completion of the stretches    Semi-Occluded Vocal Tract (SOVT) exercises instructed to reduce laryngeal tension, promote vocal fold pliability, and coordinate respiration and phonation    Straw with water resistance was found to be most facilitating     Sustained phonation, and voice vs. voiceless productions used to promote easy voicing and raise awareness of laryngeal tension    Ascending and descending glides utilized to promote vocal fold pliability    Instructed to use these exercises as a warm-up / cooldown, and to re-calibrate the voice throughout the day.    good accuracy with minimal clinician support    When good coordination of respiration and phonation was noted, reduced roughness and strain was appreciated vocally    Exercises to improve respiratory phonatory coordination     Awareness of excessive (inspiratory reserve) and insufficient (expiratory reserve) lung volumes    A counting task was utilized to promote patient recognition of increased respiratory engagement to counter recoil during exhalation, and habituate a low breath subsequent to this threshold, but before expiratory reserve volume    Language " barrier proved challenging, and maximal clinician support was required for accurate trials, and ultimately determined to be non facilitative as a result    Flow phonation stimuli were used to target ease and increased airflow during running speech    Fair accuracy with minimal to moderate support      A revised regimen for home practice was instructed.    I provided handouts of today's therapeutic activities to facilitate practice.    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  Mr. Mueller notes significant improvement in symptoms since his last visit. Language barrier was an imposition in targeting optimal voice use in running speech; however, alternative strategies were found to compensate for this to some degree.   The addition of manual therapy techniques should aid in further reduction of throat irritation. Reduced frequency of throat clearing noted.    PLAN: I will see Mr. Mueller in two weeks, at which point we will continue to target low-impact voice use in running speech, and manual therapy techniques.     TOTAL SERVICE TIME: 60 minutes  TREATMENT (21623): 60 minutes  NO CHARGE FACILITY FEE (97747)    Onel Segundo M.M., M.A., CCC-SLP  Speech-Language Pathologist  Certificate of Vocology  [phone]    Umair Segundo, LISA

## 2017-12-07 NOTE — MR AVS SNAPSHOT
After Visit Summary   12/7/2017    Haven Mueller    MRN: [id number]           Patient Information     Date Of Birth          1970        Visit Information        Provider Department      12/7/2017 10:00 AM Umair Segundo SLP [facility]        Today's Diagnoses     Dysphonia        Throat pain        Vocal fold leukoplakia        Irritable larynx syndrome           Follow-ups after your visit        Your next 10 appointments already scheduled     Dec 21, 2017 11:00 AM CST   (Arrive by 10:45 AM)   RETURN SLP VOICE with LISA Ye    [facility] ([facility])    [address] [place] [address]   [phone]            Jan 08, 2018  1:20 PM CST   (Arrive by 1:05 PM)   Return Visit with Catrina Morrison MD   [facility] ([facility])    [address] [place] [address]   [phone]           This is a multi-disciplinary care team visit as patients with your type of problem are usually seen by a team of an MD and a Speech-Language Pathologist (who is a specialist in disorders of the voice, throat, and breathing).  Please plan about 2 hours for your visit, which will likely include Laryngeal Function Studies, a Voice/Swallow/Breathing Evaluation, and an Endoscopic Laryngeal Examination to provide a comprehensive evaluation.  Please check with your insurance company to ensure you are covered for these services. - It is important to know that if you are evaluated and/or treated by both a physician and a speech pathologist during your visit, your billing will reflect the input that you receive from both providers as separate professionals. Although most insurance plans do cover these services, we encourage you to contact your insurance company prior to your visit to determine whether there are any coverage limitations that might affect you financially. -  Billing/procedure codes that are frequently associated with visits to our clinic include (but are not limited to) the ones listed below. Most patients will not need all of these items, but it may be useful to ask your insurance company's patient . 65583: Flexible fiberoptic laryngoscopy, 36562: Laryngoscopy; flexible or rigid fiberoptic, with stroboscopy, 73467: Flexible endoscopic evaluation of swallowing, 23408: Laryngeal function aerodynamic evaluation, 33415: Evaluation of Voice and Resonance, 42415: Speech pathology treatment for voice, speech, communication, 83163: Speech pathology treatment for swallowing/oral function for feeding - If you have any concerns or questions, or if you would prefer not to have a speech pathologist involved in your visit, please contact our Clinic Coordinator at [phone], at least 24 hours prior to your appointment.              Who to contact     Please call your clinic at [phone] to:    Ask questions about your health    Make or cancel appointments    Discuss your medicines    Learn about your test results    Speak to your doctor   If you have compliments or concerns about an experience at your clinic, or if you wish to file a complaint, please contact [facility] Patient Relations at [phone] or email us at [email or web address]         Additional Information About Your Visit        [email or web address] Information     [employer] is an electronic gateway that provides easy, online access to your medical records. With Bright[employer], you can request a clinic appointment, read your test results, renew a prescription or communicate with your care team.     To sign up for Bright[employer] visit the website at [email or web address]   You will be asked to enter the access code listed below, as well as some personal information. Please follow the directions to create your username and password.     Your access code is:  [email or web address]  Expires: 2018  5:30 AM     Your access code will  in 90 days. If you need help or a new code, please contact your [facility] or call [phone] for assistance.        Care EveryWhere ID     This is your Care EveryWhere ID. This could be used by other organizations to access your [place] medical records  [id number]         Blood Pressure from Last 3 Encounters:   No data found for BP    Weight from Last 3 Encounters:   17 87.5 kg (193 lb)              We Performed the Following     SPEECH/HEARING THERAPY, INDIVIDUAL        Primary Care Provider    None Specified       No primary provider on file.        Equal Access to Services     : Hadii joanna Lin, akila burrell, janes azevedoalmajeremy yeung, bimal brasher . So [facility] [phone].    ATENCIÓN: Si habla español, tiene a hebert disposición servicios gratuitos de asistencia lingüística. Llame al [phone].    We comply with applicable federal civil rights laws and [place] laws. We do not discriminate on the basis of race, color, national origin, age, disability, sex, sexual orientation, or gender identity.            Thank you!     Thank you for choosing [facility]  for your care. Our goal is always to provide you with excellent care. Hearing back from our patients is one way we can continue to improve our services. Please take a few minutes to complete the written survey that you may receive in the mail after your visit with us. Thank you!             Your Updated Medication List - Protect others around you: Learn how to safely use, store and throw away your medicines at www.disposemymeds.org.          This list is accurate as of: 17 11:24 AM.  Always use your most recent med list.                   Brand Name Dispense Instructions for use Diagnosis    fluconazole 100 MG tablet    DIFLUCAN    15 tablet    2 tabs PO qday x 1 day, then 1 tab  PO qday x 13 days for a total of 14 day course.    Fungal infection

## 2017-12-07 NOTE — PROGRESS NOTES
"[facility] VOICE CLINIC  THERAPY NOTE (CPT 35749)    Patient: Haven Mueller  Date of Service: 12/7/2017  Referring physician: Dr. Morrison  Impressions from most recent evaluation:  \"Mr. 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 \"    SUBJECTIVE:  Since his last session, Mr. Mueller reports the following:     Overall he reports that symptoms are better    Successes: reduced throat clearing, drinking more water, improved hygiene    OBJECTIVE:  PATIENT REPORTED MEASURES:  Not Applicable    0 Disagree strongly  1 Disagree    2 Neither agree nor disagree  3 Agree    4 Agree strongly    5   Since my last session, I used the speech therapy exercises and strategies as recommended by my speech pathologist. 5   I feel that using my therapy techniques has become a habit. 4   I feel confident in my ability to manage my current and future symptoms. 5      Greatly worsened  -2 Worsened  -1 Stayed the same  0 Improved   1 Greatly improved  2      Since my last session I feel my symptoms have ________. 4      Overall, since starting therapy I feel my symptoms are ________. 7   Worse  (see below) About the same  0 Better  (see below)   -1 Almost the same, hardly worse at all  -2 A little worse  -3 Somewhat worse  -4 Moderately worse  -5 A good deal worse  -6 A great deal worse  -7 A very great deal worse 1 Almost the same, hardly better at all  2 A little better  3 Somewhat better  4 Moderately better  5 A good deal better  6 A great deal better  7 A very great deal better     THERAPEUTIC ACTIVITIES    Exercises to promote optimal respiratory mechanics    I provided explanation of the anatomy and physiology of respiration for speech and singing; he found this to be helpful    Practiced in a forward leaning seated posture to facilitate awareness of low respiratory engagement    With clinician support, " "patient was able to demonstrate improved abdominal relaxation and engagement on inhalation    Exercises to promote reduced perilaryngeal muscle tension    Four way neck stretch instructed    Modifications for additional extension instructed    Base of tongue stretch instructed    Proper form for each stretch was emphasized, including:    Maintenance of posture for 10 breaths (~20-30 seconds)    Awareness of tension on inhalation with volitional relaxation into the stretch on exhalation    Avoidance of \"forcing\" a posture, only progressing far enough to feel the stretch    He reported awareness of significant tension during lateral neck stretches    He noted increased sense of muscular relaxation following completion of the stretches    Semi-Occluded Vocal Tract (SOVT) exercises instructed to reduce laryngeal tension, promote vocal fold pliability, and coordinate respiration and phonation    Straw with water resistance was found to be most facilitating     Sustained phonation, and voice vs. voiceless productions used to promote easy voicing and raise awareness of laryngeal tension    Ascending and descending glides utilized to promote vocal fold pliability    Instructed to use these exercises as a warm-up / cooldown, and to re-calibrate the voice throughout the day.    good accuracy with minimal clinician support    When good coordination of respiration and phonation was noted, reduced roughness and strain was appreciated vocally    Exercises to improve respiratory phonatory coordination     Awareness of excessive (inspiratory reserve) and insufficient (expiratory reserve) lung volumes    A counting task was utilized to promote patient recognition of increased respiratory engagement to counter recoil during exhalation, and habituate a low breath subsequent to this threshold, but before expiratory reserve volume    Language barrier proved challenging, and maximal clinician support was required for accurate trials, and " ultimately determined to be non facilitative as a result    Flow phonation stimuli were used to target ease and increased airflow during running speech    Fair accuracy with minimal to moderate support      A revised regimen for home practice was instructed.    I provided handouts of today's therapeutic activities to facilitate practice.    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  Mr. Mueller notes significant improvement in symptoms since his last visit. Language barrier was an imposition in targeting optimal voice use in running speech; however, alternative strategies were found to compensate for this to some degree.   The addition of manual therapy techniques should aid in further reduction of throat irritation. Reduced frequency of throat clearing noted.    PLAN: I will see Mr. Mueller in two weeks, at which point we will continue to target low-impact voice use in running speech, and manual therapy techniques.     TOTAL SERVICE TIME: 60 minutes  TREATMENT (83333): 60 minutes  NO CHARGE FACILITY FEE (31483)    Onel Segundo M.M., M.A., CCC-SLP  Speech-Language Pathologist  Certificate of Vocology  [phone]

## 2017-12-21 ENCOUNTER — OFFICE VISIT (OUTPATIENT)
Dept: OTOLARYNGOLOGY | Facility: CLINIC | Age: 47
End: 2017-12-21
Payer: COMMERCIAL

## 2017-12-21 DIAGNOSIS — R07.0 THROAT PAIN: ICD-10-CM

## 2017-12-21 DIAGNOSIS — R49.0 DYSPHONIA: ICD-10-CM

## 2017-12-21 DIAGNOSIS — J38.3 VOCAL FOLD LEUKOPLAKIA: ICD-10-CM

## 2017-12-21 DIAGNOSIS — J38.7 IRRITABLE LARYNX SYNDROME: ICD-10-CM

## 2017-12-21 NOTE — MR AVS SNAPSHOT
After Visit Summary   12/21/2017    Haven Mueller    MRN: [id number]           Patient Information     Date Of Birth          1970        Visit Information        Provider Department      12/21/2017 10:00 AM Umair Segundo SLP [facility]        Today's Diagnoses     Dysphonia        Throat pain        Vocal fold leukoplakia        Irritable larynx syndrome           Follow-ups after your visit        Your next 10 appointments already scheduled     Jan 08, 2018  1:20 PM CST   (Arrive by 1:05 PM)   Return Visit with Catrina Morrison MD   [facility] ([facility] [place])    [address] [place] [address] [phone]           This is a multi-disciplinary care team visit as patients with your type of problem are usually seen by a team of an MD and a Speech-Language Pathologist (who is a specialist in disorders of the voice, throat, and breathing).  Please plan about 2 hours for your visit, which will likely include Laryngeal Function Studies, a Voice/Swallow/Breathing Evaluation, and an Endoscopic Laryngeal Examination to provide a comprehensive evaluation.  Please check with your insurance company to ensure you are covered for these services. - It is important to know that if you are evaluated and/or treated by both a physician and a speech pathologist during your visit, your billing will reflect the input that you receive from both providers as separate professionals. Although most insurance plans do cover these services, we encourage you to contact your insurance company prior to your visit to determine whether there are any coverage limitations that might affect you financially. - Billing/procedure codes that are frequently associated with visits to our clinic include (but are not limited to) the ones listed below. Most patients will not need all of these items, but it may be useful to ask your insurance company's  patient . 17662: Flexible fiberoptic laryngoscopy, 14030: Laryngoscopy; flexible or rigid fiberoptic, with stroboscopy, 58659: Flexible endoscopic evaluation of swallowing, 72225: Laryngeal function aerodynamic evaluation, 33174: Evaluation of Voice and Resonance, 69847: Speech pathology treatment for voice, speech, communication, 88946: Speech pathology treatment for swallowing/oral function for feeding - If you have any concerns or questions, or if you would prefer not to have a speech pathologist involved in your visit, please contact our Clinic Coordinator at [phone], at least 24 hours prior to your appointment.              Who to contact     Please call your clinic at [phone] to:    Ask questions about your health    Make or cancel appointments    Discuss your medicines    Learn about your test results    Speak to your doctor   If you have compliments or concerns about an experience at your clinic, or if you wish to file a complaint, please contact [facility] Patient Relations at [phone] or email us at [email or web address]         Additional Information About Your Visit        [employer] Information     [employer] is an electronic gateway that provides easy, online access to your medical records. With [employer], you can request a clinic appointment, read your test results, renew a prescription or communicate with your care team.     To sign up for [employer] visit the website at [email or web address]   You will be asked to enter the access code listed below, as well as some personal information. Please follow the directions to create your username and password.     Your access code is: [email or web address]  Expires: 2018  5:30 AM     Your access code will  in 90 days. If you need help or a new code, please contact your [facility] or call [phone] for assistance.        Care EveryWhere ID     This is  your Care EveryWhere ID. This could be used by other organizations to access your [place] medical records  [id number]         Blood Pressure from Last 3 Encounters:   No data found for BP    Weight from Last 3 Encounters:   04/03/17 87.5 kg (193 lb)              We Performed the Following     SPEECH/HEARING THERAPY, INDIVIDUAL        Primary Care Provider    None Specified       No primary provider on file.        Equal Access to Services     [facility]: Hadii aad ku hadasho Soomaali, waaxda luqadaha, qaybta kaalmada adeegyada, bimal garnica haymerlinen adelewis gomesdarciesukhjinder brasher . So [facility] [phone].    ATENCIÓN: Si habla español, tiene a hebert disposición servicios gratuitos de asistencia lingüística. Llame al [phone].    We comply with applicable federal civil rights laws and [place] laws. We do not discriminate on the basis of race, color, national origin, age, disability, sex, sexual orientation, or gender identity.            Thank you!     Thank you for choosing  [facility]  for your care. Our goal is always to provide you with excellent care. Hearing back from our patients is one way we can continue to improve our services. Please take a few minutes to complete the written survey that you may receive in the mail after your visit with us. Thank you!             Your Updated Medication List - Protect others around you: Learn how to safely use, store and throw away your medicines at www.disposemymeds.org.          This list is accurate as of: 12/21/17 11:07 AM.  Always use your most recent med list.                   Brand Name Dispense Instructions for use Diagnosis    fluconazole 100 MG tablet    DIFLUCAN    15 tablet    2 tabs PO qday x 1 day, then 1 tab PO qday x 13 days for a total of 14 day course.    Fungal infection

## 2017-12-21 NOTE — LETTER
Date:December 27, 2017      Patient was self referred, no letter generated. Do not send.        [facility] Health Information

## 2017-12-21 NOTE — LETTER
"12/21/2017      RE: Haven Mueller  [address]   [place] [address]       [facility] VOICE CLINIC  THERAPY NOTE (CPT 77676)    Patient: Haven Mueller  Date of Service: 12/21/2017  Referring physician: Dr. Morrison  Impressions from most recent evaluation:  \"Mr. 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 \"    SUBJECTIVE:  Since his last session, Mr. Mueller reports the following:     Overall he reports that symptoms are significantly improved    Reducing voice use, coughing less, throat discomfort has diminished to a 2/10    OBJECTIVE:  PATIENT REPORTED MEASURES:  Not Applicable    0 Disagree strongly  1 Disagree    2 Neither agree nor disagree  3 Agree    4 Agree strongly    5   Since my last session, I used the speech therapy exercises and strategies as recommended by my speech pathologist. 5   I feel that using my therapy techniques has become a habit. 4   I feel confident in my ability to manage my current and future symptoms. 5      Greatly worsened  -2 Worsened  -1 Stayed the same  0 Improved   1 Greatly improved  2      Since my last session I feel my symptoms have ________. 1      Overall, since starting therapy I feel my symptoms are ________. 6   Worse  (see below) About the same  0 Better  (see below)   -1 Almost the same, hardly worse at all  -2 A little worse  -3 Somewhat worse  -4 Moderately worse  -5 A good deal worse  -6 A great deal worse  -7 A very great deal worse 1 Almost the same, hardly better at all  2 A little better  3 Somewhat better  4 Moderately better  5 A good deal better  6 A great deal better  7 A very great deal better     THERAPEUTIC ACTIVITIES    Manual Laryngeal massage was performed in combination with gentle forward resonant sounds    Significant tension of the perilaryngeal musculature including thyrohyoid space and base of tongue. "  Patient reported tenderness in the TH space L>R).     Thyrohyoid space,and base of tongue were targeted with gentle circular massage    Gentle lateralization of the larynx, and sternocleidomastoid massage was also performed.    Patient was trained to focus on intentional relaxation of jaw and tongue in addition to area of massage during these maneuvers.    Comfortably quiet forward resonant /m/ on descending glides was utilized throughout massage    Self-massage was instructed and patient was able to demonstrate this with acceptable accuracy  Exercises to improve respiratory phonatory coordination     Flow phonation exercises to work on carryover of respiratory phonatory coordination to running speech    /j/ word stimuli were facilitative    Moderate clinician support required    Review of SOVT for tactile and visual cue of airflow was facilitating    Intermittent cues to use airflow generously and take adequate breath prior to initiation were required.      A revised regimen for home practice was instructed.    I provided an audio recording and handouts of today's therapeutic activities to facilitate practice.    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   Mr. Mueller feels that his symptoms have improved marked, with reduction of discomfort, and dysphonia.  Intermittent rough /strained voice quality were appreciated today, and this was able to be addressed effectively through the use of flow focused therapy techniques; however, consistent support was required to promote accuracy.    PLAN: I will see Mr. Mueller in 2-3 weeks, at his follow-up with Dr. Morrison.     TOTAL SERVICE TIME: 60 minutes  TREATMENT (13746): 60 minutes  NO CHARGE FACILITY FEE (02480)    Onel Segundo M.M., M.A., CCC-SLP  Speech-Language  Pathologist  Certificate of Vocology  [phone]      Umair Segundo, SLP

## 2017-12-21 NOTE — PROGRESS NOTES
"[facility] VOICE CLINIC  THERAPY NOTE ([facility] [address])    Patient: Haven Mueller  Date of Service: 12/21/2017  Referring physician: Dr. Morrison  Impressions from most recent evaluation:  \"Mr. 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 \"    SUBJECTIVE:  Since his last session, Mr. Mueller reports the following:     Overall he reports that symptoms are significantly improved    Reducing voice use, coughing less, throat discomfort has diminished to a 2/10    OBJECTIVE:  PATIENT REPORTED MEASURES:  Not Applicable    0 Disagree strongly  1 Disagree    2 Neither agree nor disagree  3 Agree    4 Agree strongly    5   Since my last session, I used the speech therapy exercises and strategies as recommended by my speech pathologist. 5   I feel that using my therapy techniques has become a habit. 4   I feel confident in my ability to manage my current and future symptoms. 5      Greatly worsened  -2 Worsened  -1 Stayed the same  0 Improved   1 Greatly improved  2      Since my last session I feel my symptoms have ________. 1      Overall, since starting therapy I feel my symptoms are ________. 6   Worse  (see below) About the same  0 Better  (see below)   -1 Almost the same, hardly worse at all  -2 A little worse  -3 Somewhat worse  -4 Moderately worse  -5 A good deal worse  -6 A great deal worse  -7 A very great deal worse 1 Almost the same, hardly better at all  2 A little better  3 Somewhat better  4 Moderately better  5 A good deal better  6 A great deal better  7 A very great deal better     THERAPEUTIC ACTIVITIES    Manual Laryngeal massage was performed in combination with gentle forward resonant sounds    Significant tension of the perilaryngeal musculature including thyrohyoid space and base of tongue.  Patient reported tenderness in the TH space L>R).     Thyrohyoid space,and base of tongue " were targeted with gentle circular massage    Gentle lateralization of the larynx, and sternocleidomastoid massage was also performed.    Patient was trained to focus on intentional relaxation of jaw and tongue in addition to area of massage during these maneuvers.    Comfortably quiet forward resonant /m/ on descending glides was utilized throughout massage    Self-massage was instructed and patient was able to demonstrate this with acceptable accuracy  Exercises to improve respiratory phonatory coordination     Flow phonation exercises to work on carryover of respiratory phonatory coordination to running speech    /j/ word stimuli were facilitative    Moderate clinician support required    Review of SOVT for tactile and visual cue of airflow was facilitating    Intermittent cues to use airflow generously and take adequate breath prior to initiation were required.      A revised regimen for home practice was instructed.    I provided an audio recording and handouts of today's therapeutic activities to facilitate practice.    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   Mr. Mueller feels that his symptoms have improved marked, with reduction of discomfort, and dysphonia.  Intermittent rough /strained voice quality were appreciated today, and this was able to be addressed effectively through the use of flow focused therapy techniques; however, consistent support was required to promote accuracy.    PLAN: I will see Mr. Mueller in 2-3 weeks, at his follow-up with Dr. Morrison.     TOTAL SERVICE TIME: 60 minutes  TREATMENT (12761): 60 minutes  NO CHARGE FACILITY FEE (37758)    Onel Segundo M.M., M.A., CCC-SLP  Speech-Language Pathologist  Certificate of Vocology  [phone]

## 2018-01-03 ENCOUNTER — TELEPHONE (OUTPATIENT)
Dept: OTOLARYNGOLOGY | Facility: CLINIC | Age: 48
End: 2018-01-03

## 2018-01-03 NOTE — TELEPHONE ENCOUNTER
RN calling pt to verify that pt has not yet seen a GI provider as recommended by Dr. Morrison.  Pt states he has not seen a gastroenterologist. RN will re-fax referral to [facility] to scheduled this consultation.    Lianne PAGANN, RN  [phone]  [facility]   Head & Neck Surgery   1/3/2018 11:53 AM